# Patient Record
Sex: FEMALE | Race: WHITE | NOT HISPANIC OR LATINO | URBAN - METROPOLITAN AREA
[De-identification: names, ages, dates, MRNs, and addresses within clinical notes are randomized per-mention and may not be internally consistent; named-entity substitution may affect disease eponyms.]

---
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
## 2021-08-17 ENCOUNTER — RX ONLY (RX ONLY)
Age: 21
End: 2021-08-17

## 2021-08-17 ENCOUNTER — SKIN CANCER EXAM (OUTPATIENT)
Dept: URBAN - METROPOLITAN AREA CLINIC 31 | Facility: CLINIC | Age: 21
Setting detail: DERMATOLOGY
End: 2021-08-17

## 2021-08-17 DIAGNOSIS — L82.0 INFLAMED SEBORRHEIC KERATOSIS: ICD-10-CM

## 2021-08-17 PROBLEM — D18.01 HEMANGIOMA OF SKIN AND SUBCUTANEOUS TISSUE: Status: RESOLVED | Noted: 2021-08-17

## 2021-08-17 PROBLEM — D18.01 HEMANGIOMA OF SKIN AND SUBCUTANEOUS TISSUE: Status: ACTIVE | Noted: 2021-08-17

## 2021-08-17 PROCEDURE — 99214 OFFICE O/P EST MOD 30 MIN: CPT

## 2021-08-17 RX ORDER — KETOCONAZOLE 20 MG/ML
1 A SMALL AMOUNT SHAMPOO, SUSPENSION TOPICAL EVERY OTHER DAY
Qty: 120 | Refills: 1
Start: 2021-08-17

## 2022-07-26 ENCOUNTER — SEE NOTE (OUTPATIENT)
Dept: URBAN - METROPOLITAN AREA CLINIC 31 | Facility: CLINIC | Age: 22
Setting detail: DERMATOLOGY
End: 2022-07-26

## 2022-07-26 DIAGNOSIS — Z41.9 ENCOUNTER FOR PROCEDURE FOR PURPOSES OTHER THAN REMEDYING HEALTH STATE, UNSPECIFIED: ICD-10-CM

## 2022-07-26 PROCEDURE — OTHER EAR PIERCING: OTHER

## 2022-07-26 PROCEDURE — 17110 DESTRUCT B9 LESION 1-14: CPT

## 2024-06-12 ENCOUNTER — APPOINTMENT (OUTPATIENT)
Dept: URBAN - METROPOLITAN AREA CLINIC 206 | Age: 24
Setting detail: DERMATOLOGY
End: 2024-06-12

## 2024-06-12 DIAGNOSIS — L82.1 OTHER SEBORRHEIC KERATOSIS: ICD-10-CM

## 2024-06-12 DIAGNOSIS — D18.0 HEMANGIOMA: ICD-10-CM

## 2024-06-12 DIAGNOSIS — Z71.89 OTHER SPECIFIED COUNSELING: ICD-10-CM

## 2024-06-12 DIAGNOSIS — L23.9 ALLERGIC CONTACT DERMATITIS, UNSPECIFIED CAUSE: ICD-10-CM

## 2024-06-12 DIAGNOSIS — L57.8 OTHER SKIN CHANGES DUE TO CHRONIC EXPOSURE TO NONIONIZING RADIATION: ICD-10-CM

## 2024-06-12 DIAGNOSIS — D22 MELANOCYTIC NEVI: ICD-10-CM

## 2024-06-12 PROBLEM — D18.01 HEMANGIOMA OF SKIN AND SUBCUTANEOUS TISSUE: Status: ACTIVE | Noted: 2024-06-12

## 2024-06-12 PROBLEM — D22.72 MELANOCYTIC NEVI OF LEFT LOWER LIMB, INCLUDING HIP: Status: ACTIVE | Noted: 2024-06-12

## 2024-06-12 PROCEDURE — OTHER SUNSCREEN RECOMMENDATIONS: OTHER

## 2024-06-12 PROCEDURE — OTHER COUNSELING: OTHER

## 2024-06-12 PROCEDURE — OTHER ADDITIONAL NOTES: OTHER

## 2024-06-12 PROCEDURE — OTHER MIPS QUALITY: OTHER

## 2024-06-12 PROCEDURE — 99213 OFFICE O/P EST LOW 20 MIN: CPT

## 2024-06-12 ASSESSMENT — LOCATION SIMPLE DESCRIPTION DERM
LOCATION SIMPLE: LEFT SHOULDER
LOCATION SIMPLE: LEFT ACHILLES SKIN

## 2024-06-12 ASSESSMENT — ITCH NUMERIC RATING SCALE: HOW SEVERE IS YOUR ITCHING?: 1

## 2024-06-12 ASSESSMENT — LOCATION ZONE DERM
LOCATION ZONE: LEG
LOCATION ZONE: ARM

## 2024-06-12 ASSESSMENT — LOCATION DETAILED DESCRIPTION DERM
LOCATION DETAILED: LEFT ACHILLES SKIN
LOCATION DETAILED: LEFT ANTERIOR SHOULDER

## 2024-06-12 NOTE — PROCEDURE: ADDITIONAL NOTES
Additional Notes: OTC Hydrocortisone cream
Render Risk Assessment In Note?: no
Detail Level: Detailed

## 2024-06-12 NOTE — HPI: FULL BODY SKIN EXAMINATION
How Severe Are Your Spot(S)?: moderate
What Type Of Note Output Would You Prefer (Optional)?: Standard Output
What Is The Reason For Today's Visit?: Full Body Skin Examination with No Concerns
What Is The Reason For Today's Visit? (Being Monitored For X): concerning skin lesions on an annual basis
Additional History: She denies any moles or skin lesions of concern.

## 2024-06-12 NOTE — PROCEDURE: SUNSCREEN RECOMMENDATIONS

## 2025-06-02 ENCOUNTER — APPOINTMENT (OUTPATIENT)
Dept: URBAN - METROPOLITAN AREA CLINIC 206 | Age: 25
Setting detail: DERMATOLOGY
End: 2025-06-03

## 2025-06-02 DIAGNOSIS — L57.8 OTHER SKIN CHANGES DUE TO CHRONIC EXPOSURE TO NONIONIZING RADIATION: ICD-10-CM

## 2025-06-02 DIAGNOSIS — D18.0 HEMANGIOMA: ICD-10-CM

## 2025-06-02 DIAGNOSIS — D22 MELANOCYTIC NEVI: ICD-10-CM

## 2025-06-02 DIAGNOSIS — Z71.89 OTHER SPECIFIED COUNSELING: ICD-10-CM

## 2025-06-02 DIAGNOSIS — D49.2 NEOPLASM OF UNSPECIFIED BEHAVIOR OF BONE, SOFT TISSUE, AND SKIN: ICD-10-CM

## 2025-06-02 DIAGNOSIS — L81.4 OTHER MELANIN HYPERPIGMENTATION: ICD-10-CM

## 2025-06-02 DIAGNOSIS — L81.3 CAFÉ AU LAIT SPOTS: ICD-10-CM

## 2025-06-02 DIAGNOSIS — L82.1 OTHER SEBORRHEIC KERATOSIS: ICD-10-CM

## 2025-06-02 PROBLEM — D23.5 OTHER BENIGN NEOPLASM OF SKIN OF TRUNK: Status: ACTIVE | Noted: 2025-06-02

## 2025-06-02 PROBLEM — D18.01 HEMANGIOMA OF SKIN AND SUBCUTANEOUS TISSUE: Status: ACTIVE | Noted: 2025-06-02

## 2025-06-02 PROBLEM — D22.39 MELANOCYTIC NEVI OF OTHER PARTS OF FACE: Status: ACTIVE | Noted: 2025-06-02

## 2025-06-02 PROBLEM — D22.5 MELANOCYTIC NEVI OF TRUNK: Status: ACTIVE | Noted: 2025-06-02

## 2025-06-02 PROCEDURE — OTHER COUNSELING: OTHER

## 2025-06-02 PROCEDURE — OTHER SUNSCREEN RECOMMENDATIONS: OTHER

## 2025-06-02 PROCEDURE — 99213 OFFICE O/P EST LOW 20 MIN: CPT | Mod: 25

## 2025-06-02 PROCEDURE — 11310 SHAVE SKIN LESION 0.5 CM/<: CPT

## 2025-06-02 PROCEDURE — OTHER SHAVE REMOVAL: OTHER

## 2025-06-02 PROCEDURE — OTHER MIPS QUALITY: OTHER

## 2025-06-02 ASSESSMENT — LOCATION SIMPLE DESCRIPTION DERM
LOCATION SIMPLE: ABDOMEN
LOCATION SIMPLE: LEFT FOREARM
LOCATION SIMPLE: LOWER BACK
LOCATION SIMPLE: UPPER BACK
LOCATION SIMPLE: RIGHT FOREARM
LOCATION SIMPLE: TRAPEZIAL NECK
LOCATION SIMPLE: RIGHT CHEEK
LOCATION SIMPLE: LEFT CHEEK
LOCATION SIMPLE: RIGHT TEMPLE
LOCATION SIMPLE: RIGHT PRETIBIAL REGION
LOCATION SIMPLE: LEFT ANTERIOR NECK

## 2025-06-02 ASSESSMENT — LOCATION DETAILED DESCRIPTION DERM
LOCATION DETAILED: RIGHT PROXIMAL DORSAL FOREARM
LOCATION DETAILED: RIGHT LATERAL TEMPLE
LOCATION DETAILED: SUPERIOR LUMBAR SPINE
LOCATION DETAILED: EPIGASTRIC SKIN
LOCATION DETAILED: LEFT INFERIOR ANTERIOR NECK
LOCATION DETAILED: SUPERIOR THORACIC SPINE
LOCATION DETAILED: LEFT PROXIMAL DORSAL FOREARM
LOCATION DETAILED: LEFT INFERIOR MEDIAL MALAR CHEEK
LOCATION DETAILED: RIGHT CENTRAL MALAR CHEEK
LOCATION DETAILED: MID TRAPEZIAL NECK
LOCATION DETAILED: RIGHT DISTAL PRETIBIAL REGION

## 2025-06-02 ASSESSMENT — LOCATION ZONE DERM
LOCATION ZONE: LEG
LOCATION ZONE: TRUNK
LOCATION ZONE: NECK
LOCATION ZONE: ARM
LOCATION ZONE: FACE